# Patient Record
Sex: MALE | Race: WHITE | Employment: STUDENT | URBAN - METROPOLITAN AREA
[De-identification: names, ages, dates, MRNs, and addresses within clinical notes are randomized per-mention and may not be internally consistent; named-entity substitution may affect disease eponyms.]

---

## 2018-11-30 ENCOUNTER — HOSPITAL ENCOUNTER (EMERGENCY)
Age: 23
Discharge: HOME | End: 2018-11-30
Attending: EMERGENCY MEDICINE
Payer: COMMERCIAL

## 2018-11-30 ENCOUNTER — APPOINTMENT (OUTPATIENT)
Dept: GENERAL RADIOLOGY | Age: 23
End: 2018-11-30
Attending: EMERGENCY MEDICINE
Payer: COMMERCIAL

## 2018-11-30 VITALS
DIASTOLIC BLOOD PRESSURE: 90 MMHG | TEMPERATURE: 98.5 F | BODY MASS INDEX: 25.2 KG/M2 | WEIGHT: 180 LBS | HEART RATE: 87 BPM | HEIGHT: 71 IN | RESPIRATION RATE: 16 BRPM | SYSTOLIC BLOOD PRESSURE: 135 MMHG | OXYGEN SATURATION: 100 %

## 2018-11-30 DIAGNOSIS — S92.491A OTHER FRACTURE OF RIGHT GREAT TOE, INITIAL ENCOUNTER FOR CLOSED FRACTURE: Primary | ICD-10-CM

## 2018-11-30 DIAGNOSIS — T14.8XXA AVULSION FRACTURE: ICD-10-CM

## 2018-11-30 PROCEDURE — 75810000053 HC SPLINT APPLICATION

## 2018-11-30 PROCEDURE — 73660 X-RAY EXAM OF TOE(S): CPT

## 2018-11-30 PROCEDURE — 99283 EMERGENCY DEPT VISIT LOW MDM: CPT

## 2018-11-30 RX ORDER — IBUPROFEN 600 MG/1
600 TABLET ORAL
Qty: 20 TAB | Refills: 0 | Status: SHIPPED | OUTPATIENT
Start: 2018-11-30

## 2018-11-30 RX ORDER — HYDROCODONE BITARTRATE AND ACETAMINOPHEN 5; 325 MG/1; MG/1
1 TABLET ORAL
Qty: 12 TAB | Refills: 0 | Status: SHIPPED | OUTPATIENT
Start: 2018-11-30

## 2018-11-30 NOTE — LETTER
[facility] [place] 
[facility] 
[address] [address] 
[phone] Work/School Note Date: 11/30/2018 To Whom It May concern: 
 
Mason Childers was seen and treated today in the emergency room by the following provider(s): 
Physician Assistant: Wyatt Tapia PA-C. Mason Childers RETURN TO SCHOOL/WORK ON 12/3/18.  
 
Sincerely, 
 
 
 
 
Parker Gerber RN

## 2018-11-30 NOTE — ED PROVIDER NOTES
EMERGENCY DEPARTMENT HISTORY AND PHYSICAL EXAM 
 
Date: 11/30/2018 Patient Name: Ester Dotson History of Presenting Illness Chief Complaint Patient presents with  Toe Pain History Provided By: Patient Chief Complaint: Right great toe pain Duration: >12 hours Timing:  Acute Location: Right great toe Severity: 8 out of 10 Modifying Factors: Some relief from Advil, ice, elevation Associated Symptoms: Contusion to right great toe, swelling of the right great toe Additional History (Context):  
10:33 AM 
Ester Dotson is a 26 y.o. male with PMHX toe fracture presents to the emergency department C/O right great toe pain (8/10) onset >12 hours ago with some relief from Advil, ice, elevation. Associated sxs include contusion to right great toe, swelling of the right great toe. Pt states he was playing soccer last night when he kicked another players shin, hurting his toe. Pt denies previous fracture to the right great toe, SHx, and any other sxs or complaints. PCP: Jesus Alvarez MD 
 
Current Outpatient Medications Medication Sig Dispense Refill  
 HYDROcodone-acetaminophen (NORCO) 5-325 mg per tablet Take 1 Tab by mouth every four (4) hours as needed for Pain. Max Daily Amount: 6 Tabs. 12 Tab 0  ibuprofen (MOTRIN) 600 mg tablet Take 1 Tab by mouth every six (6) hours as needed for Pain. Take with food. 20 Tab 0  
 cetirizine (ZYRTEC) 10 mg tablet Take  by mouth daily. Past History Past Medical History: 
Past Medical History:  
Diagnosis Date  H/O seasonal allergies Past Surgical History: 
Past Surgical History:  
Procedure Laterality Date  HX TONSILLECTOMY  HX TYMPANOSTOMY  HX WISDOM TEETH EXTRACTION Family History: 
Family History Problem Relation Age of Onset  Heart Disease Paternal Grandfather   
     defibrillator(>age 61) Social History: 
Social History Tobacco Use  Smoking status: Never Smoker  Smokeless tobacco: Never Used Substance Use Topics  Alcohol use: No  
 Drug use: No  
 
 
Allergies: Allergies Allergen Reactions  Peanut Nausea and Vomiting Review of Systems Review of Systems Musculoskeletal: Positive for arthralgias (Right great toe pain) and joint swelling (Right great toe swelling). Skin: Positive for color change (contusion to right great toe). Neurological: Negative for weakness and numbness. All other systems reviewed and are negative. Physical Exam  
 
Vitals:  
 11/30/18 1025 BP: 135/90 Pulse: 87 Resp: 16 Temp: 98.5 °F (36.9 °C) SpO2: 100% Weight: 81.6 kg (180 lb) Height: 5' 11\" (1.803 m) Physical Exam  
Constitutional: He appears well-developed and well-nourished. No distress. HENT:  
Head: Normocephalic and atraumatic. Right Ear: External ear normal.  
Left Ear: External ear normal.  
Neck: Normal range of motion. Cardiovascular: Normal rate, regular rhythm, normal heart sounds and intact distal pulses. Pulses: 
     Dorsalis pedis pulses are 2+ on the right side, and 2+ on the left side. Posterior tibial pulses are 2+ on the right side, and 2+ on the left side. Pulmonary/Chest: Effort normal and breath sounds normal.  
Musculoskeletal:  
     Right foot: There is decreased range of motion, tenderness and swelling. There is normal capillary refill and no deformity. Feet: 
 
Skin: He is not diaphoretic. Nursing note and vitals reviewed. Diagnostic Study Results Labs: 
 No results found for this or any previous visit (from the past 12 hour(s)). Radiologic Studies: XR GREAT TOE RT MIN 2 V Final Result IMPRESSION: 
  
  
1. Soft tissue swelling and capsular avulsion injury involving the dorsal great 
toe IP joint. CT Results  (Last 48 hours) None CXR Results  (Last 48 hours) None MEDICATIONS GIVEN: 
Medications - No data to display Medical Decision Making I am the first provider for this patient. I reviewed the vital signs, available nursing notes, past medical history, past surgical history, family history and social history. Vital Signs: Reviewed the patient's vital signs. Pulse Oximetry Analysis: 100% on RA Records Reviewed: Nursing Notes and Old Medical Records Provider Notes (Medical Decision Making): sprain, strain, fx, ligamentous, contusion ED Course:  
10:33 AM Initial assessment performed. The patients presenting problems have been discussed, and they are in agreement with the care plan formulated and outlined with them. I have encouraged them to ask questions as they arise throughout their visit. Diagnosis and Disposition Imaging reveals avulsion fx of R great toe. NVI. Will splint and bennett tape. Crutches. FU with podiatry as concern for ligamentous injury. Reasons to RTED discussed with pt. All questions answered. Pt feels comfortable going home at this time. Pt expressed understanding and he agrees with plan. DISCHARGE NOTE: 
11:13 AM 
Jose Apgar  results have been reviewed with him. He has been counseled regarding his diagnosis, treatment, and plan. He verbally conveys understanding and agreement of the signs, symptoms, diagnosis, treatment and prognosis and additionally agrees to follow up as discussed. He also agrees with the care-plan and conveys that all of his questions have been answered. I have also provided discharge instructions for him that include: educational information regarding their diagnosis and treatment, and list of reasons why they would want to return to the ED prior to their follow-up appointment, should his condition change. He has been provided with education for proper emergency department utilization. CLINICAL IMPRESSION: 
 
1. Other fracture of right great toe, initial encounter for closed fracture 2. Avulsion fracture PLAN: 
1. D/C Home 2. Discharge Medication List as of 11/30/2018 11:12 AM  
  
START taking these medications Details HYDROcodone-acetaminophen (NORCO) 5-325 mg per tablet Take 1 Tab by mouth every four (4) hours as needed for Pain. Max Daily Amount: 6 Tabs., Print, Disp-12 Tab, R-0  
  
ibuprofen (MOTRIN) 600 mg tablet Take 1 Tab by mouth every six (6) hours as needed for Pain. Take with food. , Print, Disp-20 Tab, R-0  
  
  
CONTINUE these medications which have NOT CHANGED Details  
cetirizine (ZYRTEC) 10 mg tablet Take  by mouth daily. , Historical Med 3. Follow-up Information Follow up With Specialties Details Why Contact Info Chaz Potter DPM Podiatry  Follow up with podiatry [address] [facility] [id number] [facility] 
[phone] [facility] EMERGENCY DEPT Emergency Medicine  As needed, If symptoms worsen [address] [place] [id number] [phone]  
  
 
_______________________________ Attestations:  
 
SCRIBE ATTESTATION: 
This note is prepared by Delroy Marx, acting as Scribe for Josiah Sanchez PA-C. 
 
PROVIDER ATTESTATION: 
Josiah Sanchez PA-C: The scribe's documentation has been prepared under my direction and personally reviewed by me in its entirety. I confirm that the note above accurately reflects all work, treatment, procedures, and medical decision making performed by me.